# Patient Record
Sex: MALE | Race: BLACK OR AFRICAN AMERICAN | NOT HISPANIC OR LATINO | Employment: FULL TIME | URBAN - METROPOLITAN AREA
[De-identification: names, ages, dates, MRNs, and addresses within clinical notes are randomized per-mention and may not be internally consistent; named-entity substitution may affect disease eponyms.]

---

## 2024-08-29 ENCOUNTER — ANCILLARY PROCEDURE (OUTPATIENT)
Dept: GENERAL RADIOLOGY | Facility: CLINIC | Age: 39
End: 2024-08-29
Attending: PHYSICIAN ASSISTANT
Payer: COMMERCIAL

## 2024-08-29 ENCOUNTER — OFFICE VISIT (OUTPATIENT)
Dept: ORTHOPEDICS | Facility: CLINIC | Age: 39
End: 2024-08-29
Payer: COMMERCIAL

## 2024-08-29 VITALS
DIASTOLIC BLOOD PRESSURE: 80 MMHG | BODY MASS INDEX: 28.04 KG/M2 | WEIGHT: 185 LBS | HEIGHT: 68 IN | SYSTOLIC BLOOD PRESSURE: 118 MMHG | HEART RATE: 54 BPM | OXYGEN SATURATION: 96 %

## 2024-08-29 DIAGNOSIS — M25.571 ACUTE RIGHT ANKLE PAIN: ICD-10-CM

## 2024-08-29 DIAGNOSIS — M76.61 RIGHT ACHILLES TENDINITIS: ICD-10-CM

## 2024-08-29 DIAGNOSIS — M25.571 ACUTE RIGHT ANKLE PAIN: Primary | ICD-10-CM

## 2024-08-29 PROCEDURE — 99203 OFFICE O/P NEW LOW 30 MIN: CPT | Performed by: PHYSICIAN ASSISTANT

## 2024-08-29 PROCEDURE — 73610 X-RAY EXAM OF ANKLE: CPT | Mod: RT | Performed by: INTERNAL MEDICINE

## 2024-08-29 NOTE — PROGRESS NOTES
ASSESSMENT & PLAN  Darryn Arnold is seen today for his right posterior ankle pain.  On exam he is consistent with an Achilles tendinitis.  We discussed the nature of this pathology of the tendinitis.  We discussed management of this with ibuprofen, ice, physical therapy to improve his symptoms.  We discussed that should his symptoms fail to improve or worsen he can follow-up with our podiatry team for further evaluation.  He was understanding this plan and agreement.  All questions were answered.    Kaylin Isabel PA-C  [facility]    -----  Chief Complaint   Patient presents with    Right Ankle - Pain       SUBJECTIVE  Darryn Arnold is a/an 39 year old right-handed male who is seen as a WALK IN patient for evaluation of right ankle.  Onset was last month. Pain is located hip down to the ankle. Symptoms are worsened by walking, weight bearing.  He has tried cream. Associated symptoms include   pain .    The patient is seen with child    Prior injury/Surgical history of affected joint: no  Social History/Occupation:     REVIEW OF SYSTEMS:  Pertinent positives/negative: As stated above in HPI    OBJECTIVE:  There were no vitals taken for this visit.   General: Alert and in no distress  Skin: no visable rashes  CV: Extremities appear well perfused   Resp: normal respiratory effort, no conversational dyspnea   Psych: normal mood, affect  MSK:   Right ankle/foot exam:    Inspection:  There is no evidence of open wounds.   There is no evidence of erythema or infection  There is no appreciable swelling or synovitis.    Palpation:  There is no palpable fluctuance  There is tenderness to palpation at Achilles tendon and medial malleoli  There is no tenderness to palpation at lateral malleoli    Strength:  Dorsiflexion: 5/5  Plantarflexion: 5/5  Inversion: 5/5  Eversion: 5/5    Sensory:  Intact to light touch    Dorsalis pedis pulse is palpable and equal to contralateral  side    Range of Motion:  Flexion: 20 degrees  Extension: 50 degrees    Examination Maneuvers:  Negative hi sign  Negative homans sign       RADIOLOGY:  Final results and radiologist's interpretation available in the [facility] health record.  Images below were personally reviewed and discussed with the patient in the office today.  My personal interpretation of the performed imaging: Right ankle x-rays demonstrate no acute fracture or dislocation.

## 2024-08-29 NOTE — LETTER
8/29/2024      Darryn Arnold  [address] [facility] [address]      Dear Colleague,    Thank you for referring your patient, Darryn Arnold, to the [facility] [place]. Please see a copy of my visit note below.    ASSESSMENT & PLAN  Darryn Arnold is seen today for his right posterior ankle pain.  On exam he is consistent with an Achilles tendinitis.  We discussed the nature of this pathology of the tendinitis.  We discussed management of this with ibuprofen, ice, physical therapy to improve his symptoms.  We discussed that should his symptoms fail to improve or worsen he can follow-up with our podiatry team for further evaluation.  He was understanding this plan and agreement.  All questions were answered.    Kaylin Isabel PA-C  [facility]    -----  Chief Complaint   Patient presents with     Right Ankle - Pain       SUBJECTIVE  Darryn Arnold is a/an 39 year old right-handed male who is seen as a WALK IN patient for evaluation of right ankle.  Onset was last month. Pain is located hip down to the ankle. Symptoms are worsened by walking, weight bearing.  He has tried cream. Associated symptoms include   pain .    The patient is seen with child    Prior injury/Surgical history of affected joint: no  Social History/Occupation:     REVIEW OF SYSTEMS:  Pertinent positives/negative: As stated above in HPI    OBJECTIVE:  There were no vitals taken for this visit.   General: Alert and in no distress  Skin: no visable rashes  CV: Extremities appear well perfused   Resp: normal respiratory effort, no conversational dyspnea   Psych: normal mood, affect  MSK:   Right ankle/foot exam:    Inspection:  There is no evidence of open wounds.   There is no evidence of erythema or infection  There is no appreciable swelling or synovitis.    Palpation:  There is no palpable fluctuance  There is tenderness to palpation at Achilles tendon and medial  malleoli  There is no tenderness to palpation at lateral malleoli    Strength:  Dorsiflexion: 5/5  Plantarflexion: 5/5  Inversion: 5/5  Eversion: 5/5    Sensory:  Intact to light touch    Dorsalis pedis pulse is palpable and equal to contralateral side    Range of Motion:  Flexion: 20 degrees  Extension: 50 degrees    Examination Maneuvers:  Negative hi sign  Negative homans sign       RADIOLOGY:  Final results and radiologist's interpretation available in the [facility] health record.  Images below were personally reviewed and discussed with the patient in the office today.  My personal interpretation of the performed imaging: Right ankle x-rays demonstrate no acute fracture or dislocation.          Again, thank you for allowing me to participate in the care of your patient.        Sincerely,        Kaylin Isabel PA-C

## 2024-08-29 NOTE — PATIENT INSTRUCTIONS
1. Acute right ankle pain    2. Right Achilles tendinitis        -Patient presents with posterior ankle pain that has been ongoing for 1 month.  We discussed that on exam symptoms are most consistent with an Achilles tendinitis.  We discussed management for this  -Recommend ibuprofen 400 mg twice daily for 2 weeks, then take as needed for pain.  -Recommend to apply ice  -Physical therapy for stretches and exercises as well as pain modalities  -Follow-up with podiatry should symptoms fail to improve    -Call direct clinic number [[phone]] at any time with questions or concerns.    -Orthopedic Walk in Monday through Thursday 8 am to 6 pm, Friday 8 am to 5 pm, Saturday 8 am to 12 pm at [address] [address].

## 2024-08-30 ENCOUNTER — THERAPY VISIT (OUTPATIENT)
Dept: PHYSICAL THERAPY | Facility: CLINIC | Age: 39
End: 2024-08-30
Attending: PHYSICIAN ASSISTANT
Payer: COMMERCIAL

## 2024-08-30 DIAGNOSIS — M25.571 ACUTE RIGHT ANKLE PAIN: ICD-10-CM

## 2024-08-30 DIAGNOSIS — M76.61 RIGHT ACHILLES TENDINITIS: ICD-10-CM

## 2024-08-30 PROCEDURE — 97161 PT EVAL LOW COMPLEX 20 MIN: CPT | Mod: GP | Performed by: PHYSICAL THERAPIST

## 2024-08-30 PROCEDURE — 97110 THERAPEUTIC EXERCISES: CPT | Mod: GP | Performed by: PHYSICAL THERAPIST

## 2024-08-30 ASSESSMENT — ACTIVITIES OF DAILY LIVING (ADL)
ANY_OF_YOUR_USUAL_WORK,_HOUSEWORK_OR_SCHOOL_ACTIVITIES: QUITE A BIT OF DIFFICULTY
PLEASE_INDICATE_YOR_PRIMARY_REASON_FOR_REFERRAL_TO_THERAPY:: FOOT AND/OR ANKLE
LEFS_SCORE(%): INCOMPLETE
LEFS_RAW_SCORE: INCOMPLETE

## 2024-08-30 NOTE — PROGRESS NOTES
PHYSICAL THERAPY EVALUATION  Type of Visit: Evaluation        Fall Risk Screen:  Fall screen completed by: PT  Have you fallen 2 or more times in the past year?: No  Have you fallen and had an injury in the past year?: No  Is patient a fall risk?: No    Subjective onset of right posterior ankle pain 1 month ago of unknown etiology. Pt referred by MD for physical therapy on 24      Presenting condition or subjective complaint:    Date of onset:      Relevant medical history:     Dates & types of surgery:      Prior diagnostic imaging/testing results:       Prior therapy history for the same diagnosis, illness or injury:        Prior Level of Function  Transfers: Independent  Ambulation: Independent  ADL: Independent  IADL: Childcare, Driving, Housekeeping, Work    Living Environment  Social support:     Type of home:     Stairs to enter the home:         Ramp:     Stairs inside the home:         Help at home:    Equipment owned:       Employment:      Hobbies/Interests:      Patient goals for therapy:      Pain assessment: Pain present  Location: right  posterior ankle /Ratin/10     Objective   ANKLE:    Standing Posture: WNL    Gait: decreased functional dorsiflexion right ankle    SL Balance:   L: good  sec (R hip drop)  R: good sec (L hip drop)    ROM/Strength: (* indicates patient's pain)   AROM L AROM R MMT L MMT R   Dorsiflexion  2  4/5   Plantarflexion  50 SL heel raises /10 SL heel raises /10   Inversion (prone)    5/5   Eversion (prone)    5/5   G Toe Ext       G Toe Flex         Other Tests:  -Closed Chain Dorsiflexion:       R= cm ; L= cm    Edema:    General:   Figure 8: L:  ; R:     Palpation: tenderness to palpation distal Achilles Tendon    Ankle/Foot Mobilizations (hyper vs hypo):   - Talocrual: hypomobile  - Subtalar:   - Talonavicular:   - Calcaneocuboid:   - Cubonavicular-cuniform:     Special Tests:   L R   Anterior Drawer     Posterior Drawer     Valgus Stress     Varus Stress      External Rotation     Reilly's (Achilles)     Calcaneal tap     Squeeze test     Windlass Test     Prerna's (DVT)           Assessment & Plan   CLINICAL IMPRESSIONS  Medical Diagnosis: right Achilles tendonitis    Treatment Diagnosis: right posterior ankle pain, decreased ROM and strength   Impression/Assessment: Patient is a 39 year old male with right posterior ankle  complaints.  The following significant findings have been identified: Pain, Decreased ROM/flexibility, and Decreased strength. These impairments interfere with their ability to perform self care tasks, work tasks, recreational activities, household chores, driving , household mobility, and community mobility as compared to previous level of function.     Clinical Decision Making (Complexity):  Clinical Presentation: Stable/Uncomplicated  Clinical Presentation Rationale: based on medical and personal factors listed in PT evaluation  Clinical Decision Making (Complexity): Low complexity    PLAN OF CARE  Treatment Interventions:  Modalities: Ultrasound  Interventions: Manual Therapy, Therapeutic Exercise    Long Term Goals     PT Goal 1  Goal Identifier: stairs  Goal Description: pt able to ascend and descend 10 steps pain level 2  Rationale: to maximize safety and independence with performance of ADLs and functional tasks;to maximize safety and independence within the home;to maximize safety and independence within the community;to maximize safety and independence with transportation;to maximize safety and independence with self cares  Target Date: 10/25/24      Frequency of Treatment: 1x/week  Duration of Treatment: 6 weeks    Recommended Referrals to Other Professionals:   Education Assessment:   Learner/Method: No Barriers to Learning    Risks and benefits of evaluation/treatment have been explained.   Patient/Family/caregiver agrees with Plan of Care.     Evaluation Time:     PT Eval, Low Complexity Minutes (70112): 20       Signing Clinician:  Osiel Cook, PT

## 2024-09-04 ENCOUNTER — THERAPY VISIT (OUTPATIENT)
Dept: PHYSICAL THERAPY | Facility: CLINIC | Age: 39
End: 2024-09-04
Attending: PHYSICIAN ASSISTANT
Payer: COMMERCIAL

## 2024-09-04 DIAGNOSIS — M25.571 ACUTE RIGHT ANKLE PAIN: Primary | ICD-10-CM

## 2024-09-04 DIAGNOSIS — M76.61 RIGHT ACHILLES TENDINITIS: ICD-10-CM

## 2024-09-04 PROCEDURE — 97110 THERAPEUTIC EXERCISES: CPT | Mod: GP | Performed by: PHYSICAL THERAPIST

## 2024-09-04 PROCEDURE — 97035 APP MDLTY 1+ULTRASOUND EA 15: CPT | Mod: GP | Performed by: PHYSICAL THERAPIST

## 2024-09-10 ENCOUNTER — THERAPY VISIT (OUTPATIENT)
Dept: PHYSICAL THERAPY | Facility: CLINIC | Age: 39
End: 2024-09-10
Payer: COMMERCIAL

## 2024-09-10 DIAGNOSIS — M76.61 RIGHT ACHILLES TENDINITIS: ICD-10-CM

## 2024-09-10 DIAGNOSIS — M25.571 ACUTE RIGHT ANKLE PAIN: Primary | ICD-10-CM

## 2024-09-10 PROCEDURE — 97110 THERAPEUTIC EXERCISES: CPT | Mod: GP | Performed by: PHYSICAL THERAPIST

## 2024-10-07 PROBLEM — M76.61 RIGHT ACHILLES TENDINITIS: Status: RESOLVED | Noted: 2024-08-30 | Resolved: 2024-10-07

## 2024-10-07 PROBLEM — M25.571 ACUTE RIGHT ANKLE PAIN: Status: RESOLVED | Noted: 2024-08-30 | Resolved: 2024-10-07

## 2024-10-07 NOTE — PROGRESS NOTES
DISCHARGE  Reason for Discharge: Patient has not made expected progress due to interrupted treatment attendance.  Patient has failed to schedule further appointments.    Discharge Plan: Patient to continue home program.    Referring Provider:  Kaylin Isabel       09/10/24 0500   Appointment Info   Signing clinician's name / credentials Mary Lopez PT, DPT   Total/Authorized Visits 6   Visits Used 3   Medical Diagnosis right Achilles tendonitis   PT Tx Diagnosis right posterior ankle pain, decreased ROM and strength   Progress Note/Certification   Therapy Frequency 1x/week   Predicted Duration 6 weeks   PT Goal 1   Goal Identifier stairs   Goal Description pt able to ascend and descend 10 steps pain level 2   Rationale to maximize safety and independence with performance of ADLs and functional tasks;to maximize safety and independence within the home;to maximize safety and independence within the community;to maximize safety and independence with transportation;to maximize safety and independence with self cares   Target Date 10/25/24   Subjective Report   Subjective Report Continues to have R posterior ankle discomfort, especially after walking the mall yesterday.   Therapeutic Procedure/Exercise   Therapeutic Procedures: strength, endurance, ROM, flexibility minutes (90676) 32   Skilled Intervention cues, HEP review and progression   Patient Response/Progress tolerates with appropriate fatigue and sx   PTRx Ther Proc 1 Ankle Alphabet   PTRx Ther Proc 1 - Details reviewed reason for interventions   PTRx Ther Proc 2 Gastroc Stretch Off Edge of Step   PTRx Ther Proc 2 - Details progression tolerated well 2x30 seconds on wedge then step   PTRx Ther Proc 3 Hip Abduction Straight Leg Raise   PTRx Ther Proc 3 - Details continues to fatigue when working R hip x20 reps   PTRx Ther Proc 4 Clamshell with Theraband   PTRx Ther Proc 4 - Details progression with blue band for increased challenge and strengthening   PTRx Ther  Proc 5 Standing Alternating Plantarflexion and Dorsiflexion   PTRx Ther Proc 5 - Details added to HEP tolerated but does fatigue requires double leg stance   PTRx Ther Proc 6 Towel Stretch Gastroc   PTRx Ther Proc 6 - Details progressed to standing stretching   PTRx Ther Proc 7 Theraband Ankle Dorsiflexion   PTRx Ther Proc 7 - Details increasing ease mild discomfort when completed no banded resistance and against gravity   Therapeutic Activity   PTRx Ther Act 1 Icing   PTRx Ther Act 1 - Details complete at home as needed   Education   Learner/Method Patient;Pictures/Video;No Barriers to Learning   Education Comments progression of HEP with increased resistance   Plan   Home program see PTRX   Plan for next session progress as tolerated   Total Session Time   Timed Code Treatment Minutes 32   Total Treatment Time (sum of timed and untimed services) 32

## 2025-03-20 ENCOUNTER — HOSPITAL ENCOUNTER (EMERGENCY)
Facility: CLINIC | Age: 40
Discharge: HOME | End: 2025-03-20
Attending: PHYSICIAN ASSISTANT
Payer: COMMERCIAL

## 2025-03-20 VITALS
TEMPERATURE: 99.2 F | BODY MASS INDEX: 28.64 KG/M2 | DIASTOLIC BLOOD PRESSURE: 68 MMHG | OXYGEN SATURATION: 99 % | RESPIRATION RATE: 17 BRPM | HEIGHT: 68 IN | WEIGHT: 189 LBS | SYSTOLIC BLOOD PRESSURE: 120 MMHG | HEART RATE: 87 BPM

## 2025-03-20 DIAGNOSIS — J02.0 STREP PHARYNGITIS: ICD-10-CM

## 2025-03-20 LAB
FLUAV RNA SPEC QL NAA+PROBE: NEGATIVE
FLUBV RNA RESP QL NAA+PROBE: NEGATIVE
RSV RNA SPEC NAA+PROBE: NEGATIVE
S PYO DNA THROAT QL NAA+PROBE: DETECTED
SARS-COV-2 RNA RESP QL NAA+PROBE: NEGATIVE

## 2025-03-20 PROCEDURE — 250N000013 HC RX MED GY IP 250 OP 250 PS 637: Performed by: PHYSICIAN ASSISTANT

## 2025-03-20 PROCEDURE — 87651 STREP A DNA AMP PROBE: CPT | Performed by: PHYSICIAN ASSISTANT

## 2025-03-20 PROCEDURE — 87637 SARSCOV2&INF A&B&RSV AMP PRB: CPT | Performed by: PHYSICIAN ASSISTANT

## 2025-03-20 PROCEDURE — 99284 EMERGENCY DEPT VISIT MOD MDM: CPT

## 2025-03-20 PROCEDURE — 250N000011 HC RX IP 250 OP 636: Performed by: PHYSICIAN ASSISTANT

## 2025-03-20 RX ORDER — PENICILLIN V POTASSIUM 500 MG/1
500 TABLET, FILM COATED ORAL 3 TIMES DAILY
Qty: 30 TABLET | Refills: 0 | Status: SHIPPED | OUTPATIENT
Start: 2025-03-20 | End: 2025-03-30

## 2025-03-20 RX ORDER — ONDANSETRON 4 MG/1
4 TABLET, ORALLY DISINTEGRATING ORAL ONCE
Status: COMPLETED | OUTPATIENT
Start: 2025-03-20 | End: 2025-03-20

## 2025-03-20 RX ORDER — ACETAMINOPHEN 500 MG
1000 TABLET ORAL ONCE
Status: COMPLETED | OUTPATIENT
Start: 2025-03-20 | End: 2025-03-20

## 2025-03-20 RX ORDER — IBUPROFEN 600 MG/1
600 TABLET, FILM COATED ORAL ONCE
Status: COMPLETED | OUTPATIENT
Start: 2025-03-20 | End: 2025-03-20

## 2025-03-20 RX ORDER — ONDANSETRON 4 MG/1
4 TABLET, ORALLY DISINTEGRATING ORAL EVERY 6 HOURS PRN
Qty: 10 TABLET | Refills: 0 | Status: SHIPPED | OUTPATIENT
Start: 2025-03-20 | End: 2025-03-23

## 2025-03-20 RX ADMIN — ACETAMINOPHEN 1000 MG: 500 TABLET ORAL at 17:55

## 2025-03-20 RX ADMIN — ONDANSETRON 4 MG: 4 TABLET, ORALLY DISINTEGRATING ORAL at 17:55

## 2025-03-20 RX ADMIN — IBUPROFEN 600 MG: 600 TABLET, FILM COATED ORAL at 17:55

## 2025-03-20 ASSESSMENT — COLUMBIA-SUICIDE SEVERITY RATING SCALE - C-SSRS
1. IN THE PAST MONTH, HAVE YOU WISHED YOU WERE DEAD OR WISHED YOU COULD GO TO SLEEP AND NOT WAKE UP?: NO
2. HAVE YOU ACTUALLY HAD ANY THOUGHTS OF KILLING YOURSELF IN THE PAST MONTH?: NO
6. HAVE YOU EVER DONE ANYTHING, STARTED TO DO ANYTHING, OR PREPARED TO DO ANYTHING TO END YOUR LIFE?: NO

## 2025-03-20 ASSESSMENT — ACTIVITIES OF DAILY LIVING (ADL)
ADLS_ACUITY_SCORE: 41
ADLS_ACUITY_SCORE: 41

## 2025-03-20 NOTE — ED PROVIDER NOTES
"  Emergency Department Note      History of Present Illness     Chief Complaint   Flu Symptoms      HPI   Darryn Arnold is a 39 year old male otherwise healthy who presents for evaluation of flu symptoms.  The patient states that he woke up this morning with a sore throat body aches and fever.  He has had a little congestion but no cough.  Aches all over.  Feels nauseous but no vomiting or diarrhea.  No dysuria or hematuria.  Denies abdominal sob, or chest pain.  Does feel sweaty.  No leg swelling or joint swelling or rashes.  Does not use IV drugs.  Felt lightheaded earlier.  Headache as well. No neck stiffness.  No recent international travel.  Took Mucinex at home earlier without relief.  No known sick contacts.    Independent Historian   None    Review of External Notes   I reviewed Dr. Oneal [facility] IM note from 2/17/23.  Note negative HIV, Hep C, GC.  Positve stronglyoides treated w/ivermectin    Past Medical History     Medical History and Problem List   No past medical history on file.    Medications   ondansetron (ZOFRAN ODT) 4 MG ODT tab  penicillin V (VEETID) 500 MG tablet        Surgical History   No past surgical history on file.    Physical Exam     Patient Vitals for the past 24 hrs:   BP Temp Temp src Pulse Resp SpO2 Height Weight   03/20/25 1741 114/75 99.2  F (37.3  C) Oral 96 18 97 % 1.727 m (5' 8\") 85.7 kg (189 lb)     Physical Exam  General: Awake, alert, non-toxic.  Head:  Scalp is NC/AT.  No facial or periorbital swelling or redness.  Eyes:  Conjunctiva normal, PERRL, EOMI.  ENT:  TM's normal BL.  Mastoids and external ear structures w/out redness, swelling or ttp BL.    Oropharynx red with 1+ BL tonsillar swelling.. No lesions or exudates.  Uvula midline.  No trismus,sublingual, or submandibular swelling.  Handling secretions no muffled voice.  Neck:  Normal ROM in all directions without rigidity, no masses.  CV:  Regular rate and rhythm    No pathologic murmur, rubs, or " gallops.  Resp:  Breath sounds are clear bilaterally    Non-labored, no retractions or accessory muscle use  Abdomen: Abdomen is soft, no distension, no tenderness, no masses.  MS:  No lower extremity edema or swelling. No midline cervical, thoracic, or lumbar tenderness  Lymph: Mild BL tender anterior cervical lymphadenopathy  Skin:  Warm and dry, No rash or lesions noted.  Neuro:  Alert and oriented.  GCS 15 Moves all extremities normal.  No facial asymmetry. Gait normal.  Psych:  Awake. Alert. Normal affect. Appropriate interactions.      Diagnostics     Lab Results   Labs Ordered and Resulted from Time of ED Arrival to Time of ED Departure   GROUP A STREPTOCOCCUS PCR THROAT SWAB - Abnormal       Result Value    Group A strep by PCR Detected (*)    INFLUENZA A/B, RSV AND SARS-COV2 PCR - Normal    Influenza A PCR Negative      Influenza B PCR Negative      RSV PCR Negative      SARS CoV2 PCR Negative             Independent Interpretation   None    ED Course      Medications Administered   Medications   ibuprofen (ADVIL/MOTRIN) tablet 600 mg (600 mg Oral $Given 3/20/25 1755)   acetaminophen (TYLENOL) tablet 1,000 mg (1,000 mg Oral $Given 3/20/25 1755)   ondansetron (ZOFRAN ODT) ODT tab 4 mg (4 mg Oral $Given 3/20/25 1755)       Procedures   Procedures     Discussion of Management   None    ED Course        Additional Documentation  None    Medical Decision Making / Diagnosis     CMS Diagnoses: None    MIPS       None    MDM   39 year old male presents with sore throat, fever, body aches started this am.  Patient history and records reviewed.  Patient is well appearing with normal vitals.  Exam of oropharynx suggestive of pharyngitis with tonsillitis.  No evidence of more serious infection such as peritonsillar abscess, retropharyngeal abscess, epiglottitis, Feroz's Angina, Lemeirre Syndrome, meningitis, pneumonia, OM etc.  No intraoral lesions.  No evidence of other serious infection, myocarditis, chf etc. Strep  positive.  .  Patient was given symptomatic treatment in ED as above.  Recommended ibuprofen as needed for pain, and prescription given for zofran and penicillin.  Discussed indications to return to ED if new or worsening symptoms.  Follow-up with primary care provider if symptoms not improving.      Disposition   The patient was discharged.     Diagnosis     ICD-10-CM    1. Strep pharyngitis  J02.0            Discharge Medications   New Prescriptions    ONDANSETRON (ZOFRAN ODT) 4 MG ODT TAB    Take 1 tablet (4 mg) by mouth every 6 hours as needed for nausea or vomiting.    PENICILLIN V (VEETID) 500 MG TABLET    Take 1 tablet (500 mg) by mouth 3 times daily for 10 days.              Markie Wang PA-C  03/20/25 1900

## 2025-03-20 NOTE — ED TRIAGE NOTES
"Flu symptoms started this morning. \"I feel just so sick. I have a sore throat and I'm sweating and short of breath\". Mucinex taken at 1400.        "